# Patient Record
Sex: MALE | Race: WHITE | NOT HISPANIC OR LATINO
[De-identification: names, ages, dates, MRNs, and addresses within clinical notes are randomized per-mention and may not be internally consistent; named-entity substitution may affect disease eponyms.]

---

## 2021-02-17 ENCOUNTER — DASHBOARD ENCOUNTERS (OUTPATIENT)
Age: 71
End: 2021-02-17

## 2021-02-17 ENCOUNTER — OFFICE VISIT (OUTPATIENT)
Dept: URBAN - METROPOLITAN AREA CLINIC 82 | Facility: CLINIC | Age: 71
End: 2021-02-17
Payer: MEDICARE

## 2021-02-17 VITALS
HEIGHT: 62 IN | WEIGHT: 165.4 LBS | SYSTOLIC BLOOD PRESSURE: 129 MMHG | TEMPERATURE: 96.8 F | BODY MASS INDEX: 30.44 KG/M2 | HEART RATE: 66 BPM | DIASTOLIC BLOOD PRESSURE: 88 MMHG

## 2021-02-17 DIAGNOSIS — Z86.010 HISTORY OF COLON POLYPS: ICD-10-CM

## 2021-02-17 PROCEDURE — G8420 CALC BMI NORM PARAMETERS: HCPCS | Performed by: INTERNAL MEDICINE

## 2021-02-17 PROCEDURE — G9903 PT SCRN TBCO ID AS NON USER: HCPCS | Performed by: INTERNAL MEDICINE

## 2021-02-17 PROCEDURE — G8482 FLU IMMUNIZE ORDER/ADMIN: HCPCS | Performed by: INTERNAL MEDICINE

## 2021-02-17 PROCEDURE — G8427 DOCREV CUR MEDS BY ELIG CLIN: HCPCS | Performed by: INTERNAL MEDICINE

## 2021-02-17 PROCEDURE — 3017F COLORECTAL CA SCREEN DOC REV: CPT | Performed by: INTERNAL MEDICINE

## 2021-02-17 PROCEDURE — 99203 OFFICE O/P NEW LOW 30 MIN: CPT | Performed by: INTERNAL MEDICINE

## 2021-02-17 NOTE — HPI-TODAY'S VISIT:
Mr. Chatman is very pleasant 70-year-old male who presents today to schedule his surveillance colonoscopy.  His last colonoscopy was in 2013.  He had colon polyps.  He is due for 5-year surveillance. Since his last colonoscopy he was diagnosed with DVT and pulmonary embolus approximately 2 years ago.  He is now on Eliquis and will be on it indefinitely.  This is managed by his primary care physician. He also tells me he saw a cardiologist approximately 6 months ago and had a negative work-up.

## 2021-02-17 NOTE — PHYSICAL EXAM HENT:
Head,  normocephalic,  atraumatic,  Face,  Face within normal limits,  Ears,  External ears within normal limits, Doxepin Counseling:  Patient advised that the medication is sedating and not to drive a car after taking this medication. Patient informed of potential adverse effects including but not limited to dry mouth, urinary retention, and blurry vision.  The patient verbalized understanding of the proper use and possible adverse effects of doxepin.  All of the patient's questions and concerns were addressed.

## 2021-02-17 NOTE — PHYSICAL EXAM CONSTITUTIONAL:
well developed, well nourished , in no acute distress , ambulating slowly, normal communication ability

## 2021-02-19 ENCOUNTER — TELEPHONE ENCOUNTER (OUTPATIENT)
Dept: URBAN - METROPOLITAN AREA CLINIC 82 | Facility: CLINIC | Age: 71
End: 2021-02-19

## 2021-03-02 ENCOUNTER — TELEPHONE ENCOUNTER (OUTPATIENT)
Dept: URBAN - METROPOLITAN AREA CLINIC 92 | Facility: CLINIC | Age: 71
End: 2021-03-02

## 2021-03-02 ENCOUNTER — LAB OUTSIDE AN ENCOUNTER (OUTPATIENT)
Dept: URBAN - METROPOLITAN AREA CLINIC 92 | Facility: CLINIC | Age: 71
End: 2021-03-02

## 2021-03-12 PROBLEM — 428283002: Status: ACTIVE | Noted: 2021-02-17

## 2021-03-25 ENCOUNTER — OFFICE VISIT (OUTPATIENT)
Dept: URBAN - METROPOLITAN AREA SURGERY CENTER 13 | Facility: SURGERY CENTER | Age: 71
End: 2021-03-25
Payer: MEDICARE

## 2021-03-25 DIAGNOSIS — Z86.010 H/O ADENOMATOUS POLYP OF COLON: ICD-10-CM

## 2021-03-25 PROCEDURE — G8907 PT DOC NO EVENTS ON DISCHARG: HCPCS | Performed by: INTERNAL MEDICINE

## 2021-03-25 PROCEDURE — G0105 COLORECTAL SCRN; HI RISK IND: HCPCS | Performed by: INTERNAL MEDICINE

## 2024-08-05 ENCOUNTER — LAB OUTSIDE AN ENCOUNTER (OUTPATIENT)
Dept: URBAN - METROPOLITAN AREA CLINIC 82 | Facility: CLINIC | Age: 74
End: 2024-08-05

## 2024-08-05 ENCOUNTER — OFFICE VISIT (OUTPATIENT)
Dept: URBAN - METROPOLITAN AREA CLINIC 82 | Facility: CLINIC | Age: 74
End: 2024-08-05
Payer: MEDICARE

## 2024-08-05 VITALS
DIASTOLIC BLOOD PRESSURE: 91 MMHG | HEART RATE: 71 BPM | WEIGHT: 171 LBS | HEIGHT: 62 IN | BODY MASS INDEX: 31.47 KG/M2 | SYSTOLIC BLOOD PRESSURE: 146 MMHG | TEMPERATURE: 98.9 F

## 2024-08-05 DIAGNOSIS — R10.84 GENERALIZED ABDOMINAL PAIN: ICD-10-CM

## 2024-08-05 DIAGNOSIS — R11.14 BILIOUS VOMITING WITH NAUSEA: ICD-10-CM

## 2024-08-05 PROBLEM — 102614006: Status: ACTIVE | Noted: 2024-08-05

## 2024-08-05 PROBLEM — 71419002: Status: ACTIVE | Noted: 2024-08-05

## 2024-08-05 PROBLEM — 786838002: Status: ACTIVE | Noted: 2024-08-05

## 2024-08-05 PROBLEM — 371803003: Status: ACTIVE | Noted: 2024-08-05

## 2024-08-05 PROBLEM — 75878002: Status: ACTIVE | Noted: 2024-08-05

## 2024-08-05 PROBLEM — 116289008: Status: ACTIVE | Noted: 2024-08-05

## 2024-08-05 PROCEDURE — 99203 OFFICE O/P NEW LOW 30 MIN: CPT | Performed by: STUDENT IN AN ORGANIZED HEALTH CARE EDUCATION/TRAINING PROGRAM

## 2024-08-05 NOTE — HPI-TODAY'S VISIT:
73 y.o male w PMH of PE s/p loni presents today for c/o abd pain, bloating, vomiting at night time for a few months. Patient reports dealing w abd pain first. Pain all over abd region, 5/10, no pallative / provocative factors. Doesnt think pain is related to meals. 1 episode of vomiting per week, bilious amount, minimal amount, no blood. He denies any changes in his daily habits, no changes in diet. +bloating all the times, no improvement w. BM. 1-2 Bm per day, soft. No rectal bleeds. Wt stable. No ETOH. Saw PCP, labs were "normal" per pt, no records for review. CT A/P on 06/2024 noted extensive colonic diverticulosis, fusiform infrarenal abd aortic aneurysm 3.6cm, moderate size HH, left inguinal hernia, left lung pulm nodule 6mm, midly enlarged mediastinal lymph nodes of uncertain etiology.

## 2024-08-05 NOTE — PHYSICAL EXAM MUSCULOSKELETAL:
----- Message from Megan Molina MD sent at 4/26/2021  9:27 AM CDT -----  Pls call pt, no message  - e coli in urine.  In view of upcoming joint replacement surgery, recommend Bactrim DS BID x 7 days  - thyroid dose good on 75 mcg  - sugar, kidney function, liver tests, blood count are fine  - coags nl  OK for surgery      ROMs unremarkable in UEs/LEs,  , normal gait and station

## 2025-06-25 ENCOUNTER — OFFICE VISIT (OUTPATIENT)
Dept: URBAN - METROPOLITAN AREA CLINIC 82 | Facility: CLINIC | Age: 75
End: 2025-06-25